# Patient Record
Sex: FEMALE | Race: WHITE | NOT HISPANIC OR LATINO | Employment: FULL TIME | URBAN - METROPOLITAN AREA
[De-identification: names, ages, dates, MRNs, and addresses within clinical notes are randomized per-mention and may not be internally consistent; named-entity substitution may affect disease eponyms.]

---

## 2023-10-10 ENCOUNTER — APPOINTMENT (OUTPATIENT)
Dept: RADIOLOGY | Facility: HOSPITAL | Age: 48
End: 2023-10-10
Payer: COMMERCIAL

## 2023-10-10 ENCOUNTER — HOSPITAL ENCOUNTER (EMERGENCY)
Facility: HOSPITAL | Age: 48
Discharge: HOME | End: 2023-10-10
Attending: INTERNAL MEDICINE
Payer: COMMERCIAL

## 2023-10-10 VITALS
TEMPERATURE: 98.4 F | BODY MASS INDEX: 36.1 KG/M2 | RESPIRATION RATE: 18 BRPM | OXYGEN SATURATION: 92 % | WEIGHT: 230 LBS | SYSTOLIC BLOOD PRESSURE: 137 MMHG | DIASTOLIC BLOOD PRESSURE: 87 MMHG | HEART RATE: 76 BPM | HEIGHT: 67 IN

## 2023-10-10 DIAGNOSIS — R51.9 NONINTRACTABLE HEADACHE, UNSPECIFIED CHRONICITY PATTERN, UNSPECIFIED HEADACHE TYPE: Primary | ICD-10-CM

## 2023-10-10 DIAGNOSIS — R00.2 PALPITATIONS: ICD-10-CM

## 2023-10-10 DIAGNOSIS — R03.0 ELEVATED BLOOD PRESSURE READING: ICD-10-CM

## 2023-10-10 LAB
ALBUMIN SERPL BCP-MCNC: 4.5 G/DL (ref 3.4–5)
ALP SERPL-CCNC: 53 U/L (ref 33–110)
ALT SERPL W P-5'-P-CCNC: 24 U/L (ref 7–45)
ANION GAP SERPL CALC-SCNC: 13 MMOL/L (ref 10–20)
AST SERPL W P-5'-P-CCNC: 18 U/L (ref 9–39)
BASOPHILS # BLD AUTO: 0.05 X10*3/UL (ref 0–0.1)
BASOPHILS NFR BLD AUTO: 0.8 %
BILIRUB SERPL-MCNC: 0.4 MG/DL (ref 0–1.2)
BUN SERPL-MCNC: 10 MG/DL (ref 6–23)
CALCIUM SERPL-MCNC: 9.1 MG/DL (ref 8.6–10.3)
CARDIAC TROPONIN I PNL SERPL HS: 3 NG/L (ref 0–13)
CARDIAC TROPONIN I PNL SERPL HS: 4 NG/L (ref 0–13)
CHLORIDE SERPL-SCNC: 103 MMOL/L (ref 98–107)
CO2 SERPL-SCNC: 26 MMOL/L (ref 21–32)
CREAT SERPL-MCNC: 0.73 MG/DL (ref 0.5–1.05)
EOSINOPHIL # BLD AUTO: 0.1 X10*3/UL (ref 0–0.7)
EOSINOPHIL NFR BLD AUTO: 1.5 %
ERYTHROCYTE [DISTWIDTH] IN BLOOD BY AUTOMATED COUNT: 12.8 % (ref 11.5–14.5)
GFR SERPL CREATININE-BSD FRML MDRD: >90 ML/MIN/1.73M*2
GLUCOSE BLD MANUAL STRIP-MCNC: 99 MG/DL (ref 74–99)
GLUCOSE SERPL-MCNC: 88 MG/DL (ref 74–99)
HCT VFR BLD AUTO: 42.4 % (ref 36–46)
HGB BLD-MCNC: 14.2 G/DL (ref 12–16)
IMM GRANULOCYTES # BLD AUTO: 0.03 X10*3/UL (ref 0–0.7)
IMM GRANULOCYTES NFR BLD AUTO: 0.5 % (ref 0–0.9)
LYMPHOCYTES # BLD AUTO: 1.66 X10*3/UL (ref 1.2–4.8)
LYMPHOCYTES NFR BLD AUTO: 25.2 %
MAGNESIUM SERPL-MCNC: 2.01 MG/DL (ref 1.6–2.4)
MCH RBC QN AUTO: 30.8 PG (ref 26–34)
MCHC RBC AUTO-ENTMCNC: 33.5 G/DL (ref 32–36)
MCV RBC AUTO: 92 FL (ref 80–100)
MONOCYTES # BLD AUTO: 0.37 X10*3/UL (ref 0.1–1)
MONOCYTES NFR BLD AUTO: 5.6 %
NEUTROPHILS # BLD AUTO: 4.38 X10*3/UL (ref 1.2–7.7)
NEUTROPHILS NFR BLD AUTO: 66.4 %
NRBC BLD-RTO: 0 /100 WBCS (ref 0–0)
PLATELET # BLD AUTO: 239 X10*3/UL (ref 150–450)
PMV BLD AUTO: 8.7 FL (ref 7.5–11.5)
POTASSIUM SERPL-SCNC: 3.6 MMOL/L (ref 3.5–5.3)
PROT SERPL-MCNC: 6.8 G/DL (ref 6.4–8.2)
RBC # BLD AUTO: 4.61 X10*6/UL (ref 4–5.2)
SARS-COV-2 RNA RESP QL NAA+PROBE: NOT DETECTED
SODIUM SERPL-SCNC: 138 MMOL/L (ref 136–145)
TSH SERPL-ACNC: 1.52 MIU/L (ref 0.44–3.98)
WBC # BLD AUTO: 6.6 X10*3/UL (ref 4.4–11.3)

## 2023-10-10 PROCEDURE — 80053 COMPREHEN METABOLIC PANEL: CPT | Performed by: PHYSICIAN ASSISTANT

## 2023-10-10 PROCEDURE — 84484 ASSAY OF TROPONIN QUANT: CPT | Performed by: PHYSICIAN ASSISTANT

## 2023-10-10 PROCEDURE — 85025 COMPLETE CBC W/AUTO DIFF WBC: CPT | Performed by: PHYSICIAN ASSISTANT

## 2023-10-10 PROCEDURE — 82947 ASSAY GLUCOSE BLOOD QUANT: CPT

## 2023-10-10 PROCEDURE — 87635 SARS-COV-2 COVID-19 AMP PRB: CPT | Performed by: PHYSICIAN ASSISTANT

## 2023-10-10 PROCEDURE — 36415 COLL VENOUS BLD VENIPUNCTURE: CPT | Performed by: PHYSICIAN ASSISTANT

## 2023-10-10 PROCEDURE — 84443 ASSAY THYROID STIM HORMONE: CPT | Performed by: PHYSICIAN ASSISTANT

## 2023-10-10 PROCEDURE — 83735 ASSAY OF MAGNESIUM: CPT | Performed by: PHYSICIAN ASSISTANT

## 2023-10-10 PROCEDURE — 71046 X-RAY EXAM CHEST 2 VIEWS: CPT | Mod: FY

## 2023-10-10 PROCEDURE — 99283 EMERGENCY DEPT VISIT LOW MDM: CPT | Mod: 25

## 2023-10-10 PROCEDURE — 71046 X-RAY EXAM CHEST 2 VIEWS: CPT | Performed by: RADIOLOGY

## 2023-10-10 ASSESSMENT — LIFESTYLE VARIABLES
HAVE PEOPLE ANNOYED YOU BY CRITICIZING YOUR DRINKING: NO
EVER HAD A DRINK FIRST THING IN THE MORNING TO STEADY YOUR NERVES TO GET RID OF A HANGOVER: NO
EVER FELT BAD OR GUILTY ABOUT YOUR DRINKING: NO
HAVE YOU EVER FELT YOU SHOULD CUT DOWN ON YOUR DRINKING: NO

## 2023-10-10 ASSESSMENT — COLUMBIA-SUICIDE SEVERITY RATING SCALE - C-SSRS
2. HAVE YOU ACTUALLY HAD ANY THOUGHTS OF KILLING YOURSELF?: NO
6. HAVE YOU EVER DONE ANYTHING, STARTED TO DO ANYTHING, OR PREPARED TO DO ANYTHING TO END YOUR LIFE?: NO
1. IN THE PAST MONTH, HAVE YOU WISHED YOU WERE DEAD OR WISHED YOU COULD GO TO SLEEP AND NOT WAKE UP?: NO

## 2023-10-10 NOTE — ED TRIAGE NOTES
Pt states HA since last night, felt like she had hypoglycemia, pt states elevated BP at home (170/100). Pt also endorses palpitations. Pt with hx of HTN, pt denies any other significant cardiac hx. Pt ambulatory in triage. Pt denies recent illness or injuries.

## 2023-10-10 NOTE — ED PROVIDER NOTES
HPI   Chief Complaint   Patient presents with   • Headache   • Hypertension       HPI this is a 48-year-old female who currently is on losartan she takes it during the day but the last 2-week she has been taking at night because 1 day she forgot a dose and therefore switched.  She is presenting today with palpitations slight headache started yesterday no cough or fever no numbness tingling or weakness.  She did feel palpitations but did not count them.  She states she was mildly nauseated no vomiting.  She thought her blood sugar was low so she checked it but it was okay.  She is not diabetic.  She recently had a physical at [facility] in April and everything checked out okay she states.  She did not have any chest pain in her mid chest or radiation of any chest pain.  Her headaches actually better but did not take any medicine for it.  She has not taken her blood pressure medicine yet either.                    No data recorded                Patient History   Past Medical History:   Diagnosis Date   • Encounter for gynecological examination (general) (routine) without abnormal findings     Pap smear, as part of routine gynecological examination   • Personal history of diseases of the blood and blood-forming organs and certain disorders involving the immune mechanism     History of protein S deficiency   • Personal history of other benign neoplasm     History of dysplastic nevus   • Personal history of other mental and behavioral disorders     History of anxiety disorder   • Personal history of other mental and behavioral disorders     History of depression   • Personal history of other mental and behavioral disorders     History of attention deficit hyperactivity disorder (ADHD)   • Personal history of other venous thrombosis and embolism     History of deep venous thrombosis   • Schizoaffective disorder, unspecified (CMS/HCC)     Schizoaffective disorder     Past Surgical History:   Procedure Laterality  Date   • DILATION AND CURETTAGE OF UTERUS  06/07/2016    Dilation And Curettage   • OTHER SURGICAL HISTORY  06/07/2016    History Of Prior Surgery   • OTHER SURGICAL HISTORY  06/07/2016    Uterine Myomectomy     No family history on file.  Social History     Tobacco Use   • Smoking status: Not on file   • Smokeless tobacco: Not on file   Substance Use Topics   • Alcohol use: Not on file   • Drug use: Not on file       Physical Exam   ED Triage Vitals [10/10/23 1138]   Temp Heart Rate Resp BP   36.9 °C (98.4 °F) 88 18 (!) 190/91      SpO2 Temp Source Heart Rate Source Patient Position   95 % Temporal -- --      BP Location FiO2 (%)     -- --       Physical Exam  Reviewed family history social history and allergies and were noncontributory to current problem.    Review of systems as noted in history of present illness  otherwise negative. All other systems were reviewed and negative.     PMHX: Chronic conditions: reviewd in EMR, relevant history noted in HPI                Surgeries, hospitalizations: reviewed in EMR , relevant history noted in HPI                Medications: reviewed in EMR, relevant history noted in HPI                Allergies: reviewed in EMR, relevant history noted in HPI      PHYSICAL EXAM:    GENERAL/ CONSTITUTIONAL: Vitals noted, no distress. Alert and oriented  x 3. Non-toxic.  No Drooling or stridor .    HEAD: Normocephalic Atraumatic    EENT: Face appears mildly flushed . Posterior oropharynx unremarkable. No meningismus. No LAD.  No exudate present.      EYES: PERRLA EOMI     NECK: Supple. Nontender. No midline tenderness.  Full range of motion    CARDIAC: Regular, rate, rhythm. No murmurs rubs or gallops. No JVD    PULMONARY: Lungs clear bilaterally with good aeration. No wheezes rales or rhonchi. No respiratory distress.     GI: Soft, . Nontender. No peritoneal signs. Normoactive bowel sounds. No pulsatile masses.  No guarding or rebound    EXTREMITIES/MUSCULOSKELTAL: No peripheral  edema. Negative Homans bilaterally, NVIT, dorsal pedis pulses +2 /4 equal. FROM in all extremities and equal.     SKIN: No rash. Intact.     NEURO: No focal neurologic deficits,     PSYCH: appropriate mood and affect    MEDICAL DECISION MAKING:      ED Course & MDM at this point I will do basic labs to check for any anemia.  I will COVID swab her as well.  Twelve-lead EKG performed in triage showed normal sinus rhythm slightly prolonged QT rate of 95 no STEMI interpreted by Dr. GREENE  ED Course as of 10/10/23 1554   Tue Oct 10, 2023   1237 Twelve-lead EKG performed in triage shows normal sinus rhythm rate of 95 no STEMI interpreted by Dr. Greene [CV]   1343 Lab results all unremarkable as noted above.  Patient's chest x-ray pending at this time.  Patient is comfortable she did take one of her own medications of losartan and blood pressure rechecked [CV]   1407 Chest x-ray shows no remarkable findings interpreted by radiology. [CV]   1545 Delta troponin is negative.  Patient discharged to home.  Follow-up with primary care doctor. [CV]      ED Course User Index  [CV] Geneva Weathers PA-C         Diagnoses as of 10/10/23 1554   Elevated blood pressure reading   Nonintractable headache, unspecified chronicity pattern, unspecified headache type   Palpitations       Medical Decision Making  Patient had a full work-up as noted above all labs unremarkable.  Delta Trope unremarkable EKG unremarkable chest x-ray unremarkable.  She is to follow-up with her primary care doctor for any dosing changes.  Blood pressure was rechecked and improved.    Procedure  Procedures     Geneva Weathers PA-C  10/10/23 2325

## 2023-10-27 ENCOUNTER — HOSPITAL ENCOUNTER (OUTPATIENT)
Dept: CARDIOLOGY | Facility: HOSPITAL | Age: 48
Discharge: HOME | End: 2023-10-27
Payer: COMMERCIAL

## 2023-10-27 LAB
ATRIAL RATE: 95 BPM
P AXIS: 51 DEGREES
P OFFSET: 208 MS
P ONSET: 160 MS
PR INTERVAL: 128 MS
Q ONSET: 224 MS
QRS COUNT: 15 BEATS
QRS DURATION: 84 MS
QT INTERVAL: 386 MS
QTC CALCULATION(BAZETT): 485 MS
QTC FREDERICIA: 449 MS
R AXIS: 21 DEGREES
T AXIS: 51 DEGREES
T OFFSET: 417 MS
VENTRICULAR RATE: 95 BPM

## 2023-10-27 PROCEDURE — 93005 ELECTROCARDIOGRAM TRACING: CPT

## 2024-01-14 ENCOUNTER — HOSPITAL ENCOUNTER (EMERGENCY)
Facility: HOSPITAL | Age: 49
Discharge: HOME | End: 2024-01-14
Attending: STUDENT IN AN ORGANIZED HEALTH CARE EDUCATION/TRAINING PROGRAM
Payer: COMMERCIAL

## 2024-01-14 VITALS
SYSTOLIC BLOOD PRESSURE: 138 MMHG | RESPIRATION RATE: 16 BRPM | BODY MASS INDEX: 32.93 KG/M2 | OXYGEN SATURATION: 96 % | TEMPERATURE: 98.1 F | HEART RATE: 77 BPM | DIASTOLIC BLOOD PRESSURE: 88 MMHG | HEIGHT: 70 IN | WEIGHT: 230 LBS

## 2024-01-14 DIAGNOSIS — S61.219A FINGER LACERATION, INITIAL ENCOUNTER: Primary | ICD-10-CM

## 2024-01-14 PROCEDURE — 99284 EMERGENCY DEPT VISIT MOD MDM: CPT | Performed by: STUDENT IN AN ORGANIZED HEALTH CARE EDUCATION/TRAINING PROGRAM

## 2024-01-14 PROCEDURE — 2500000001 HC RX 250 WO HCPCS SELF ADMINISTERED DRUGS (ALT 637 FOR MEDICARE OP)

## 2024-01-14 PROCEDURE — 90471 IMMUNIZATION ADMIN: CPT

## 2024-01-14 PROCEDURE — 90715 TDAP VACCINE 7 YRS/> IM: CPT

## 2024-01-14 PROCEDURE — 2500000004 HC RX 250 GENERAL PHARMACY W/ HCPCS (ALT 636 FOR OP/ED)

## 2024-01-14 PROCEDURE — 99282 EMERGENCY DEPT VISIT SF MDM: CPT | Mod: 25

## 2024-01-14 RX ORDER — IBUPROFEN 400 MG/1
400 TABLET ORAL ONCE
Status: COMPLETED | OUTPATIENT
Start: 2024-01-14 | End: 2024-01-14

## 2024-01-14 RX ORDER — BACITRACIN ZINC 500 UNIT/G
OINTMENT IN PACKET (EA) TOPICAL 3 TIMES DAILY
Status: DISCONTINUED | OUTPATIENT
Start: 2024-01-14 | End: 2024-01-14 | Stop reason: HOSPADM

## 2024-01-14 RX ADMIN — TETANUS TOXOID, REDUCED DIPHTHERIA TOXOID AND ACELLULAR PERTUSSIS VACCINE, ADSORBED 0.5 ML: 5; 2.5; 8; 8; 2.5 SUSPENSION INTRAMUSCULAR at 10:19

## 2024-01-14 RX ADMIN — BACITRACIN 1 APPLICATION: 500 OINTMENT TOPICAL at 10:18

## 2024-01-14 RX ADMIN — IBUPROFEN 400 MG: 400 TABLET, FILM COATED ORAL at 10:18

## 2024-01-14 ASSESSMENT — PAIN SCALES - GENERAL
PAINLEVEL_OUTOF10: 4
PAINLEVEL_OUTOF10: 5 - MODERATE PAIN

## 2024-01-14 ASSESSMENT — COLUMBIA-SUICIDE SEVERITY RATING SCALE - C-SSRS
1. IN THE PAST MONTH, HAVE YOU WISHED YOU WERE DEAD OR WISHED YOU COULD GO TO SLEEP AND NOT WAKE UP?: NO
2. HAVE YOU ACTUALLY HAD ANY THOUGHTS OF KILLING YOURSELF?: NO
6. HAVE YOU EVER DONE ANYTHING, STARTED TO DO ANYTHING, OR PREPARED TO DO ANYTHING TO END YOUR LIFE?: NO

## 2024-01-14 ASSESSMENT — PAIN - FUNCTIONAL ASSESSMENT
PAIN_FUNCTIONAL_ASSESSMENT: 0-10
PAIN_FUNCTIONAL_ASSESSMENT: 0-10

## 2024-01-14 ASSESSMENT — PAIN DESCRIPTION - LOCATION
LOCATION: FINGER (COMMENT WHICH ONE)
LOCATION: FINGER (COMMENT WHICH ONE)

## 2024-01-14 ASSESSMENT — PAIN DESCRIPTION - PAIN TYPE: TYPE: ACUTE PAIN

## 2024-01-14 ASSESSMENT — PAIN DESCRIPTION - DESCRIPTORS: DESCRIPTORS: ACHING

## 2024-01-14 NOTE — ED TRIAGE NOTES
PT. STATES CUT LEFT MIDDLE FINGER ON CERAMIC AT WORK THIS MORNING. PT. STATES WAS CLEANED WITH PEROXIDE. LAST TETANUS UNKNOWN. DENIES NUMBNESS/TINGLING.

## 2024-01-14 NOTE — Clinical Note
Yara Ferrer was seen and treated in our emergency department on 1/14/2024.  She may return to work on 01/15/2024.       If you have any questions or concerns, please don't hesitate to call.      Kendra Leggett MD

## 2024-01-14 NOTE — DISCHARGE INSTRUCTIONS
You are seen today for laceration.  Please keep the wound clean and dry.  Your tetanus was updated.  Please return if having any fevers chills erythema surrounding the site.

## 2024-01-14 NOTE — ED PROVIDER NOTES
CC: Laceration (PT. STATES CUT LEFT MIDDLE FINGER ON CERAMIC AT WORK THIS MORNING. PT. STATES WAS CLEANED WITH PEROXIDE. LAST TETANUS UNKNOWN. DENIES NUMBNESS/TINGLING.)     HPI: Yara Ferrer is an 48 y.o. female presenting to the emergency department for laceration.  She is reporting that she had a laceration that occurred at 8 AM this morning.  She works at [employer] when a piece of ceramic cut the left middle finger.  Does not know when her last tetanus is.  Patient patient rinsed it out with peroxide.    Limitations to History:  none  Additional History Obtained from:  chart review    PMHx/PSHx:  Per HPI.   - has a past medical history of Encounter for gynecological examination (general) (routine) without abnormal findings, Personal history of diseases of the blood and blood-forming organs and certain disorders involving the immune mechanism, Personal history of other benign neoplasm, Personal history of other mental and behavioral disorders, Personal history of other mental and behavioral disorders, Personal history of other mental and behavioral disorders, Personal history of other venous thrombosis and embolism, and Schizoaffective disorder, unspecified (CMS/HCC).  - has a past surgical history that includes Dilation and curettage of uterus (06/07/2016); Other surgical history (06/07/2016); and Other surgical history (06/07/2016).    Social History:  - Tobacco:  reports that she has never smoked. She has never used smokeless tobacco.   - Alcohol:  reports current alcohol use.   - Drugs:  reports no history of drug use.     Medications: Reviewed in EMR.     Allergies:  Patient has no known allergies.    ???????????????????????????????????????????????????????????????  Triage Vitals:  T 36.7 °C (98.1 °F)  HR 77  /88  RR 16  O2 96 % None (Room air)    Physical Exam  Constitutional:       General: She is not in acute distress.  HENT:      Head: Normocephalic.   Cardiovascular:      Rate and Rhythm:  Normal rate.   Pulmonary:      Effort: Pulmonary effort is normal. No respiratory distress.   Abdominal:      General: Abdomen is flat.   Musculoskeletal:         General: Normal range of motion.      Comments: Normal range of motion of the left hand.  Cap refill less than 2.  Sensation intact.   Skin:     General: Skin is dry.      Comments: 1 cm laceration overlying the left middle finger.   Neurological:      Mental Status: She is alert and oriented to person, place, and time. Mental status is at baseline.       ???????????????????????????????????????????????????????????????    ED Course/Medical Decision Making:    Diagnoses as of 01/14/24 1441   Finger laceration, initial encounter        Yara Ferrer is a 48 y.o. female who presents with a laceration. No concern for foreign body or underlying fracture. Laceration was extensively irrigated and cleaned. Suture was considered but not indicated given the wound was well approximated and superficial. Bacitracin was applied. Tetanus was updated. Patient was discharged home in stable condition. They were advised to keep the wound clean and dry. Advised to take tylenol and motrin for pain control. Advised to follow up with PCP in 5-7 days for suture removal and follow up.      External records reviewed: recent inpatient, clinic, and prior ED notes  Diagnostic imaging independently reviewed/interpreted by me (as reflected in MDM) includes: none  Social Determinants Affecting Care: workers comp injury  Discussion of management with other providers: ED attending,    Prescription Drug Consideration: motrin, bacitracin, tetanus  Escalation of Care: none    Pt was seen and discussed with ED attending     Impression:   laceration    Disposition: discharge        Procedures ? [employer] last updated 1/14/2024 9:50 AM        Kendra Leggett MD  Resident  01/14/24 0953       Kendra Leggett MD  Resident  01/14/24 1441